# Patient Record
Sex: FEMALE | Race: BLACK OR AFRICAN AMERICAN | URBAN - METROPOLITAN AREA
[De-identification: names, ages, dates, MRNs, and addresses within clinical notes are randomized per-mention and may not be internally consistent; named-entity substitution may affect disease eponyms.]

---

## 2022-01-11 ENCOUNTER — OFFICE VISIT (OUTPATIENT)
Dept: URBAN - METROPOLITAN AREA CLINIC 25 | Facility: CLINIC | Age: 52
End: 2022-01-11
Payer: COMMERCIAL

## 2022-01-11 ENCOUNTER — WEB ENCOUNTER (OUTPATIENT)
Dept: URBAN - METROPOLITAN AREA CLINIC 25 | Facility: CLINIC | Age: 52
End: 2022-01-11

## 2022-01-11 VITALS
HEIGHT: 64 IN | SYSTOLIC BLOOD PRESSURE: 138 MMHG | DIASTOLIC BLOOD PRESSURE: 81 MMHG | BODY MASS INDEX: 29.19 KG/M2 | HEART RATE: 82 BPM | TEMPERATURE: 97.5 F | WEIGHT: 171 LBS

## 2022-01-11 DIAGNOSIS — R74.8 ELEVATED LIVER ENZYMES: ICD-10-CM

## 2022-01-11 PROBLEM — 197315008: Status: ACTIVE | Noted: 2022-01-11

## 2022-01-11 PROCEDURE — 99204 OFFICE O/P NEW MOD 45 MIN: CPT | Performed by: INTERNAL MEDICINE

## 2022-01-11 RX ORDER — DAPAGLIFLOZIN 5 MG/1
TABLET, FILM COATED ORAL
Qty: 90 | Status: ACTIVE | COMMUNITY

## 2022-01-11 RX ORDER — DULAGLUTIDE 3 MG/.5ML
INJECTION, SOLUTION SUBCUTANEOUS
Qty: 2 | Status: ACTIVE | COMMUNITY

## 2022-01-31 ENCOUNTER — LAB OUTSIDE AN ENCOUNTER (OUTPATIENT)
Dept: URBAN - METROPOLITAN AREA CLINIC 84 | Facility: CLINIC | Age: 52
End: 2022-01-31

## 2022-02-24 ENCOUNTER — OFFICE VISIT (OUTPATIENT)
Dept: URBAN - METROPOLITAN AREA CLINIC 25 | Facility: CLINIC | Age: 52
End: 2022-02-24

## 2022-03-08 LAB
ACTIN (SMOOTH MUSCLE) ANTIBODY: 8
ALPHA 2-MACROGLOBULINS, QN: 146
ALT (SGPT) P5P: 42
APOLIPOPROTEIN A-1: 115
AST (SGOT) P5P: 31
BILIRUBIN, TOTAL: 0.1
CHOLESTEROL, TOTAL: 142
COMMENT:: (no result)
FERRITIN, SERUM: 69
FIBROSIS SCORE: 0.04
FIBROSIS SCORING:: (no result)
FIBROSIS STAGE: (no result)
GGT: 16
GLUCOSE, SERUM: 192
HAPTOGLOBIN: 120
HBSAG SCREEN: NEGATIVE
HCV AB: <0.1
HEIGHT:: 64
HEP A AB, IGM: NEGATIVE
HEP B CORE AB, IGM: NEGATIVE
INTERPRETATION:: (no result)
INTERPRETATIONS:: (no result)
LIMITATIONS:: (no result)
MITOCHONDRIAL (M2) ANTIBODY: <20
NASH GRADE: (no result)
NASH SCORE: 0.75
NASH SCORING: (no result)
STEATOSIS GRADE: (no result)
STEATOSIS GRADING: (no result)
STEATOSIS SCORE: 0.72
TRIGLYCERIDES: 111
WEIGHT:: 171

## 2022-03-24 ENCOUNTER — CLAIMS CREATED FROM THE CLAIM WINDOW (OUTPATIENT)
Dept: URBAN - METROPOLITAN AREA TELEHEALTH 2 | Facility: TELEHEALTH | Age: 52
End: 2022-03-24
Payer: COMMERCIAL

## 2022-03-24 ENCOUNTER — DASHBOARD ENCOUNTERS (OUTPATIENT)
Age: 52
End: 2022-03-24

## 2022-03-24 DIAGNOSIS — R74.8 ELEVATED LIVER ENZYMES: ICD-10-CM

## 2022-03-24 DIAGNOSIS — K76.0 NAFL (NONALCOHOLIC FATTY LIVER): ICD-10-CM

## 2022-03-24 PROBLEM — 197315008: Status: ACTIVE | Noted: 2022-03-24

## 2022-03-24 PROCEDURE — 99213 OFFICE O/P EST LOW 20 MIN: CPT | Performed by: INTERNAL MEDICINE

## 2022-03-24 RX ORDER — DULAGLUTIDE 3 MG/.5ML
INJECTION, SOLUTION SUBCUTANEOUS
Qty: 2 | Status: ACTIVE | COMMUNITY

## 2022-03-24 RX ORDER — DAPAGLIFLOZIN 5 MG/1
TABLET, FILM COATED ORAL
Qty: 90 | Status: ACTIVE | COMMUNITY

## 2022-03-24 NOTE — HPI-TODAY'S VISIT:
50 yo pt with diabetes and dyslipidemia presents for f/u of abnormal liver enzymes. Review of recent studies revealed mod-severe fatty liver and SHEETS score of N2. Ultrasound also showed an area of focal fat on the liver with recommendation for MRI for better assessment. Pt is otherwise asymptomatic.